# Patient Record
Sex: MALE | Race: OTHER
[De-identification: names, ages, dates, MRNs, and addresses within clinical notes are randomized per-mention and may not be internally consistent; named-entity substitution may affect disease eponyms.]

---

## 2020-01-21 ENCOUNTER — APPOINTMENT (OUTPATIENT)
Dept: PULMONOLOGY | Facility: CLINIC | Age: 45
End: 2020-01-21
Payer: COMMERCIAL

## 2020-01-21 ENCOUNTER — TRANSCRIPTION ENCOUNTER (OUTPATIENT)
Age: 45
End: 2020-01-21

## 2020-01-21 VITALS
TEMPERATURE: 98 F | BODY MASS INDEX: 33.13 KG/M2 | DIASTOLIC BLOOD PRESSURE: 86 MMHG | SYSTOLIC BLOOD PRESSURE: 120 MMHG | HEIGHT: 73 IN | OXYGEN SATURATION: 95 % | WEIGHT: 250 LBS | HEART RATE: 81 BPM

## 2020-01-21 DIAGNOSIS — E78.5 HYPERLIPIDEMIA, UNSPECIFIED: ICD-10-CM

## 2020-01-21 DIAGNOSIS — R73.03 PREDIABETES.: ICD-10-CM

## 2020-01-21 DIAGNOSIS — D86.0 SARCOIDOSIS OF LUNG: ICD-10-CM

## 2020-01-21 DIAGNOSIS — Z83.2 FAMILY HISTORY OF DISEASES OF THE BLOOD AND BLOOD-FORMING ORGANS AND CERTAIN DISORDERS INVOLVING THE IMMUNE MECHANISM: ICD-10-CM

## 2020-01-21 PROBLEM — Z00.00 ENCOUNTER FOR PREVENTIVE HEALTH EXAMINATION: Status: ACTIVE | Noted: 2020-01-21

## 2020-01-21 PROCEDURE — 99204 OFFICE O/P NEW MOD 45 MIN: CPT

## 2020-01-21 NOTE — REASON FOR VISIT
[Consultation] : a consultation visit [Sarcoidosis] : sarcoidosis [Abnormal CT Scan] : abnormal CT Scan

## 2020-01-22 NOTE — ASSESSMENT
[FreeTextEntry1] : Reviewed his last lab work from Aug CA, CR and liver function WNL, CA on the higher end of normal.  Reviewed the images of the CT scan from 12/14/2019 impression prevascular adenopathy, subaortic adenopathy, paratracheal adenopathy, subcarinal adenopathy, right and left hilar adenopathy.  CT abdomen 10/18/2019 splenomegaly with upper abdominal/retroperitoneal , pelvic and inguinal adenopathy.  Subtle subcentimeter hypodense lesions within both kidneys and innumerable sclerotic foci throughout bones.  Pt has been diagnosed with sarcoid 10 yrs ago via biopsy of the skin and axillary lymph node. He was treated with prednisone and methotrexate for 8 yrs on and off steroids.  He has been off steroids for 1.5 years without any failure up of his condition and stable skin lesions.  \par \par Pt gets yearly eye exams without sarcoid involvement of the eye.  He will obtain the records of PFT (per pt there was improvement in PFT without treatment) and will send over last lab work. \par \par Reviewed recent bone marrow biopsy 10/30/2019 showed hypercellular for age with maturing trilineage hematopoiesis and numerous granulomas suggestive of sarcoidosis.  No evidence of leukemia or lymphoma.  \par \par After review of his studies.  Plan is to discuss with Dr. Wood need for further workup with biopsy of the lung.  Due to the strong personal history, positive biopsy of the bone marrow for sarcoid suggestive of systemic nature and strong family history of sarcoid, I believe at this time biopsy of the lung is not necessary.  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  However pt has involvement of the skin and bone.  I discussed third generation therapy with Humira and will discuss with rheumatology.  \par \par Will follow up with recommendation post discuss with referring physician.  \par \par

## 2020-01-22 NOTE — PHYSICAL EXAM
[General Appearance - Well Developed] : well developed [Normal Appearance] : normal appearance [Well Groomed] : well groomed [General Appearance - Well Nourished] : well nourished [General Appearance - In No Acute Distress] : no acute distress [No Deformities] : no deformities [Normal Conjunctiva] : the conjunctiva exhibited no abnormalities [Normal Oropharynx] : normal oropharynx [Eyelids - No Xanthelasma] : the eyelids demonstrated no xanthelasmas [Neck Appearance] : the appearance of the neck was normal [Neck Cervical Mass (___cm)] : no neck mass was observed [Thyroid Diffuse Enlargement] : the thyroid was not enlarged [Jugular Venous Distention Increased] : there was no jugular-venous distention [Heart Rate And Rhythm] : heart rate and rhythm were normal [Heart Sounds] : normal S1 and S2 [Thyroid Nodule] : there were no palpable thyroid nodules [Murmurs] : no murmurs present [Exaggerated Use Of Accessory Muscles For Inspiration] : no accessory muscle use [Auscultation Breath Sounds / Voice Sounds] : lungs were clear to auscultation bilaterally [Respiration, Rhythm And Depth] : normal respiratory rhythm and effort [Nail Clubbing] : no clubbing of the fingernails [Gait - Sufficient For Exercise Testing] : the gait was sufficient for exercise testing [Abnormal Walk] : normal gait [Petechial Hemorrhages (___cm)] : no petechial hemorrhages [Cyanosis, Localized] : no localized cyanosis [] : no ischemic changes [Deep Tendon Reflexes (DTR)] : deep tendon reflexes were 2+ and symmetric [No Focal Deficits] : no focal deficits [Sensation] : the sensory exam was normal to light touch and pinprick [Impaired Insight] : insight and judgment were intact [Oriented To Time, Place, And Person] : oriented to person, place, and time [Affect] : the affect was normal [FreeTextEntry1] : Sarcoid skin rashes diffuse over body

## 2020-01-22 NOTE — HISTORY OF PRESENT ILLNESS
[FreeTextEntry1] : 10 years he thought he has pneumonia and was in the CXR.  The CXR was abnormal.  he had a lymph node biopsy and skin biopsy and both were positive for sarcoidosis.  he was started on prednisone for 8 years intermittently.  he had PFT and was better than previous years.  Since then he was off steroids.  The reason to go back on the prednisone was for her skin rash and skin spots   He was on MTX also.  He is doing well.  he is not sob.  he recently treadmill stress test and was better than the one before .  he gained 10 pounds in the last month.